# Patient Record
Sex: MALE | Race: WHITE | NOT HISPANIC OR LATINO | Employment: FULL TIME | URBAN - METROPOLITAN AREA
[De-identification: names, ages, dates, MRNs, and addresses within clinical notes are randomized per-mention and may not be internally consistent; named-entity substitution may affect disease eponyms.]

---

## 2021-01-29 DIAGNOSIS — Z23 ENCOUNTER FOR IMMUNIZATION: ICD-10-CM

## 2021-02-04 ENCOUNTER — IMMUNIZATIONS (OUTPATIENT)
Dept: FAMILY MEDICINE CLINIC | Facility: HOSPITAL | Age: 41
End: 2021-02-04

## 2021-02-04 DIAGNOSIS — Z23 ENCOUNTER FOR IMMUNIZATION: Primary | ICD-10-CM

## 2021-02-04 PROCEDURE — 0001A SARS-COV-2 / COVID-19 MRNA VACCINE (PFIZER-BIONTECH) 30 MCG: CPT

## 2021-02-04 PROCEDURE — 91300 SARS-COV-2 / COVID-19 MRNA VACCINE (PFIZER-BIONTECH) 30 MCG: CPT

## 2021-02-24 ENCOUNTER — IMMUNIZATIONS (OUTPATIENT)
Dept: FAMILY MEDICINE CLINIC | Facility: HOSPITAL | Age: 41
End: 2021-02-24

## 2021-02-24 DIAGNOSIS — Z23 ENCOUNTER FOR IMMUNIZATION: Primary | ICD-10-CM

## 2021-02-24 PROCEDURE — 0002A SARS-COV-2 / COVID-19 MRNA VACCINE (PFIZER-BIONTECH) 30 MCG: CPT

## 2021-02-24 PROCEDURE — 91300 SARS-COV-2 / COVID-19 MRNA VACCINE (PFIZER-BIONTECH) 30 MCG: CPT

## 2024-11-13 NOTE — PROGRESS NOTES
Colon and Rectal Surgery   Carina Damian 43 y.o. male MRN: [id number]   Encounter: [id number]  11/15/24   2:15 PM      Name: Carina Damian      : 1980      MRN: [id number]  Encounter Provider: Adelso Leahy MD  Encounter Date: 11/15/2024   Encounter department: [facility] COLON AND RECTAL SURGERY [place]  :  Assessment & Plan  Family history of polyps in the colon  Carina is a 43-year-old male, he has been having some troubles with anal itching, rectal bleeding on the paper chronically at this point.  Family history significant, grandmother passed from colon cancer,All three siblings/first-degree relatives in their 40s with colon polyps.    We did discuss potential for genetic testing/polyposis syndrome such as Griggs to be ruled out.  We also discussed colonoscopy to rule out proximal lesion as well as dietary/lifestyle changes.      -Genetic counseling discussed/consult ordered  -Screening colonoscopy,discussed in a face-to-face, personal, informed consent process, the benefits, alternatives, risks including not limited to bleeding, missed lesion, perforation requiring emergent surgery discussed/understood, scheduled today  CC:  Tito Willson, DO Yasmin Lopez, MS    Orders:    Ambulatory Referral to Oncology Genetics; Future    Rectal bleeding               Carina Damian is a 43 y.o. male referred for evaluation today by Dr. Willson for rectal bleeding.       Historical Information   No past medical history on file.  No past surgical history on file.    Meds/Allergies       Current Outpatient Medications:     simvastatin (ZOCOR) 20 mg tablet, Take 20 mg by mouth, Disp: , Rfl:       No Known Allergies      Social History   Social History     Substance and Sexual Activity   Alcohol Use Not on file     Social History     Substance and Sexual Activity   Drug Use Not on file     Social History     Tobacco Use   Smoking Status Not on file   Smokeless Tobacco Not on file         Family  History:   Grandmother passed from colon cancer  3 siblings, with colon polyps in their 40s    Objective   Current Vitals:   Vitals:    11/15/24 1353   BP: 124/68   Weight: 95.7 kg (211 lb)   Height: 6' (1.829 m)     Physical Exam:  General:no distress  Neck:supple  Pulm:no increased work of breathing  CV:sinus  Abdomen:soft,nontender  Extremities:no edema

## 2024-11-13 NOTE — H&P (VIEW-ONLY)
Colon and Rectal Surgery   Carina Damian 43 y.o. male MRN: [id number]   Encounter: [id number]  11/15/24   2:15 PM      Name: Carina Damian      : 1980      MRN: [id number]  Encounter Provider: Adelso Leahy MD  Encounter Date: 11/15/2024   Encounter department: [facility] COLON AND RECTAL SURGERY [place]  :  Assessment & Plan  Family history of polyps in the colon  Carina is a 43-year-old male, he has been having some troubles with anal itching, rectal bleeding on the paper chronically at this point.  Family history significant, grandmother passed from colon cancer,All three siblings/first-degree relatives in their 40s with colon polyps.    We did discuss potential for genetic testing/polyposis syndrome such as Griggs to be ruled out.  We also discussed colonoscopy to rule out proximal lesion as well as dietary/lifestyle changes.      -Genetic counseling discussed/consult ordered  -Screening colonoscopy,discussed in a face-to-face, personal, informed consent process, the benefits, alternatives, risks including not limited to bleeding, missed lesion, perforation requiring emergent surgery discussed/understood, scheduled today  CC:  Tito Willson, DO Yasmin Lopez, MS    Orders:    Ambulatory Referral to Oncology Genetics; Future    Rectal bleeding               Carina Damian is a 43 y.o. male referred for evaluation today by Dr. Willson for rectal bleeding.       Historical Information   No past medical history on file.  No past surgical history on file.    Meds/Allergies       Current Outpatient Medications:     simvastatin (ZOCOR) 20 mg tablet, Take 20 mg by mouth, Disp: , Rfl:       No Known Allergies      Social History   Social History     Substance and Sexual Activity   Alcohol Use Not on file     Social History     Substance and Sexual Activity   Drug Use Not on file     Social History     Tobacco Use   Smoking Status Not on file   Smokeless Tobacco Not on file         Family  History:   Grandmother passed from colon cancer  3 siblings, with colon polyps in their 40s    Objective   Current Vitals:   Vitals:    11/15/24 1353   BP: 124/68   Weight: 95.7 kg (211 lb)   Height: 6' (1.829 m)     Physical Exam:  General:no distress  Neck:supple  Pulm:no increased work of breathing  CV:sinus  Abdomen:soft,nontender  Extremities:no edema

## 2024-11-15 ENCOUNTER — PREP FOR PROCEDURE (OUTPATIENT)
Age: 44
End: 2024-11-15

## 2024-11-15 ENCOUNTER — OFFICE VISIT (OUTPATIENT)
Age: 44
End: 2024-11-15
Payer: COMMERCIAL

## 2024-11-15 ENCOUNTER — TELEPHONE (OUTPATIENT)
Age: 44
End: 2024-11-15

## 2024-11-15 VITALS
BODY MASS INDEX: 28.58 KG/M2 | WEIGHT: 211 LBS | SYSTOLIC BLOOD PRESSURE: 124 MMHG | HEIGHT: 72 IN | DIASTOLIC BLOOD PRESSURE: 68 MMHG

## 2024-11-15 DIAGNOSIS — K62.5 RECTAL BLEEDING: Primary | ICD-10-CM

## 2024-11-15 DIAGNOSIS — K62.5 RECTAL BLEEDING: ICD-10-CM

## 2024-11-15 DIAGNOSIS — Z80.0 FAMILY HISTORY OF COLON CANCER: ICD-10-CM

## 2024-11-15 DIAGNOSIS — Z83.719 FAMILY HISTORY OF POLYPS IN THE COLON: Primary | ICD-10-CM

## 2024-11-15 DIAGNOSIS — Z83.719 FAMILY HISTORY OF COLONIC POLYPS: ICD-10-CM

## 2024-11-15 PROCEDURE — 99204 OFFICE O/P NEW MOD 45 MIN: CPT | Performed by: COLON & RECTAL SURGERY

## 2024-11-15 RX ORDER — SIMVASTATIN 20 MG
20 TABLET ORAL
COMMUNITY

## 2024-11-15 NOTE — ASSESSMENT & PLAN NOTE
Carina is a 43-year-old male, he has been having some troubles with anal itching, rectal bleeding on the paper chronically at this point.  Family history significant, grandmother passed from colon cancer,All three siblings/first-degree relatives in their 40s with colon polyps.    We did discuss potential for genetic testing/polyposis syndrome such as Griggs to be ruled out.  We also discussed colonoscopy to rule out proximal lesion as well as dietary/lifestyle changes.      -Genetic counseling discussed/consult ordered  -Screening colonoscopy,discussed in a face-to-face, personal, informed consent process, the benefits, alternatives, risks including not limited to bleeding, missed lesion, perforation requiring emergent surgery discussed/understood, scheduled today  CC:  DO Yasmin Marie, MS    Orders:    Ambulatory Referral to Oncology Genetics; Future

## 2024-11-15 NOTE — TELEPHONE ENCOUNTER
Pt seen 11/15/24 for rectal bleeding. Family history of colon cancer in his maternal grandmother. Family history of colon polyps through several members of his immediate family.     Colonoscopy scheduled for 11/18/24 with [facility]  No prior colonoscopy  Instructions handed to patient and sent through [employer]

## 2024-11-18 ENCOUNTER — ANESTHESIA EVENT (OUTPATIENT)
Dept: GASTROENTEROLOGY | Facility: HOSPITAL | Age: 44
End: 2024-11-18
Payer: COMMERCIAL

## 2024-11-18 ENCOUNTER — ANESTHESIA (OUTPATIENT)
Dept: GASTROENTEROLOGY | Facility: HOSPITAL | Age: 44
End: 2024-11-18
Payer: COMMERCIAL

## 2024-11-18 ENCOUNTER — HOSPITAL ENCOUNTER (OUTPATIENT)
Dept: GASTROENTEROLOGY | Facility: HOSPITAL | Age: 44
Setting detail: OUTPATIENT SURGERY
Discharge: HOME | End: 2024-11-18
Attending: COLON & RECTAL SURGERY
Payer: COMMERCIAL

## 2024-11-18 VITALS
DIASTOLIC BLOOD PRESSURE: 69 MMHG | HEART RATE: 67 BPM | WEIGHT: 199.8 LBS | HEIGHT: 72 IN | RESPIRATION RATE: 16 BRPM | BODY MASS INDEX: 27.06 KG/M2 | SYSTOLIC BLOOD PRESSURE: 108 MMHG | OXYGEN SATURATION: 97 % | TEMPERATURE: 99 F

## 2024-11-18 DIAGNOSIS — K62.5 RECTAL BLEEDING: ICD-10-CM

## 2024-11-18 DIAGNOSIS — Z80.0 FAMILY HISTORY OF COLON CANCER: ICD-10-CM

## 2024-11-18 DIAGNOSIS — Z83.719 FAMILY HISTORY OF COLONIC POLYPS: ICD-10-CM

## 2024-11-18 PROCEDURE — 45380 COLONOSCOPY AND BIOPSY: CPT | Performed by: COLON & RECTAL SURGERY

## 2024-11-18 PROCEDURE — 88305 TISSUE EXAM BY PATHOLOGIST: CPT | Performed by: PATHOLOGY

## 2024-11-18 RX ORDER — PROPOFOL 10 MG/ML
INJECTION, EMULSION INTRAVENOUS AS NEEDED
Status: DISCONTINUED | OUTPATIENT
Start: 2024-11-18 | End: 2024-11-18

## 2024-11-18 RX ORDER — LIDOCAINE HYDROCHLORIDE 20 MG/ML
INJECTION, SOLUTION EPIDURAL; INFILTRATION; INTRACAUDAL; PERINEURAL AS NEEDED
Status: DISCONTINUED | OUTPATIENT
Start: 2024-11-18 | End: 2024-11-18

## 2024-11-18 RX ORDER — SIMETHICONE 40MG/0.6ML
SUSPENSION, DROPS(FINAL DOSAGE FORM)(ML) ORAL AS NEEDED
Status: COMPLETED | OUTPATIENT
Start: 2024-11-18 | End: 2024-11-18

## 2024-11-18 RX ORDER — SODIUM CHLORIDE, SODIUM LACTATE, POTASSIUM CHLORIDE, CALCIUM CHLORIDE 600; 310; 30; 20 MG/100ML; MG/100ML; MG/100ML; MG/100ML
INJECTION, SOLUTION INTRAVENOUS CONTINUOUS PRN
Status: DISCONTINUED | OUTPATIENT
Start: 2024-11-18 | End: 2024-11-18

## 2024-11-18 RX ADMIN — PROPOFOL 50 MG: 10 INJECTION, EMULSION INTRAVENOUS at 12:13

## 2024-11-18 RX ADMIN — PROPOFOL 50 MG: 10 INJECTION, EMULSION INTRAVENOUS at 12:11

## 2024-11-18 RX ADMIN — PROPOFOL 150 MG: 10 INJECTION, EMULSION INTRAVENOUS at 12:10

## 2024-11-18 RX ADMIN — Medication 40 MG: at 12:21

## 2024-11-18 RX ADMIN — PROPOFOL 50 MG: 10 INJECTION, EMULSION INTRAVENOUS at 12:17

## 2024-11-18 RX ADMIN — PROPOFOL 50 MG: 10 INJECTION, EMULSION INTRAVENOUS at 12:29

## 2024-11-18 RX ADMIN — PROPOFOL 50 MG: 10 INJECTION, EMULSION INTRAVENOUS at 12:36

## 2024-11-18 RX ADMIN — PROPOFOL 50 MG: 10 INJECTION, EMULSION INTRAVENOUS at 12:25

## 2024-11-18 RX ADMIN — PROPOFOL 50 MG: 10 INJECTION, EMULSION INTRAVENOUS at 12:32

## 2024-11-18 RX ADMIN — LIDOCAINE HYDROCHLORIDE 100 MG: 20 INJECTION, SOLUTION EPIDURAL; INFILTRATION; INTRACAUDAL; PERINEURAL at 12:10

## 2024-11-18 RX ADMIN — SODIUM CHLORIDE, SODIUM LACTATE, POTASSIUM CHLORIDE, AND CALCIUM CHLORIDE: .6; .31; .03; .02 INJECTION, SOLUTION INTRAVENOUS at 11:58

## 2024-11-18 RX ADMIN — PROPOFOL 50 MG: 10 INJECTION, EMULSION INTRAVENOUS at 12:21

## 2024-11-18 RX ADMIN — PROPOFOL 50 MG: 10 INJECTION, EMULSION INTRAVENOUS at 12:38

## 2024-11-18 NOTE — ANESTHESIA PREPROCEDURE EVALUATION
Procedure:  COLONOSCOPY    Relevant Problems   ANESTHESIA (within normal limits)      CARDIO   (-) Angina at rest (HCC)   (-) Angina of effort (HCC)   (-) Chest pain   (-) RAMSAY (dyspnea on exertion)      ENDO   (-) Diabetes mellitus type 1 (HCC)   (-) Diabetes mellitus, type 2 (HCC)      GI/HEPATIC   (+) Rectal bleeding   (-) Chronic liver disease      /RENAL   (-) Chronic kidney disease      NEURO/PSYCH   (-) CVA (cerebral vascular accident) (HCC)   (-) Seizures (HCC)      PULMONARY  Pt has a sleep study scheduled for March for suspected JANE   (-) Asthma   (-) Chronic obstructive pulmonary disease (HCC)        Physical Exam    Airway    Mallampati score: III  TM Distance: >3 FB  Neck ROM: full     Dental   No notable dental hx     Cardiovascular      Pulmonary      Other Findings        Anesthesia Plan  ASA Score- 1     Anesthesia Type- IV sedation with anesthesia with ASA Monitors.         Additional Monitors:     Airway Plan:            Plan Factors-Exercise tolerance (METS): >4 METS.    Chart reviewed.   Existing labs reviewed. Patient summary reviewed.                  Induction- intravenous.    Postoperative Plan-         Informed Consent- Anesthetic plan and risks discussed with patient.  I personally reviewed this patient with the CRNA. Discussed and agreed on the Anesthesia Plan with the CRNA..

## 2024-11-18 NOTE — ANESTHESIA POSTPROCEDURE EVALUATION
Post-Op Assessment Note    CV Status:  Stable  Pain Score: 0    Pain management: adequate       Mental Status:  Alert and awake   Hydration Status:  Euvolemic   PONV Controlled:  Controlled   Airway Patency:  Patent     Post Op Vitals Reviewed: Yes    No anethesia notable event occurred.    Staff: Anesthesiologist, CRNA           Last Filed PACU Vitals:  Vitals Value Taken Time   Temp     Pulse     BP     Resp     SpO2         Modified Pierre:  Activity: 2 (11/18/2024 11:15 AM)  Respiration: 2 (11/18/2024 11:15 AM)  Circulation: 2 (11/18/2024 11:15 AM)  Consciousness: 2 (11/18/2024 11:15 AM)  Oxygen Saturation: 2 (11/18/2024 11:15 AM)  Modified Pierre Score: 10 (11/18/2024 11:15 AM)

## 2024-11-18 NOTE — INTERVAL H&P NOTE
H&P reviewed. After examining the patient I find no changes in the patients condition since the H&P had been written.    Vitals:    11/18/24 1133   BP: 159/84   Pulse: 91   Resp: 16   Temp: 99 °F (37.2 °C)   SpO2: 99%

## 2024-11-18 NOTE — TELEPHONE ENCOUNTER
Patient called to know the time of scheduled colonoscopy since he did not receive a call. Patient was informed that time of procedure is at 11 am. Patient was also recommended to go 45 mins prior to procedure

## 2024-11-24 PROCEDURE — 88305 TISSUE EXAM BY PATHOLOGIST: CPT | Performed by: PATHOLOGY

## 2024-11-25 ENCOUNTER — RESULTS FOLLOW-UP (OUTPATIENT)
Dept: OTHER | Facility: HOSPITAL | Age: 44
End: 2024-11-25

## 2024-12-06 ENCOUNTER — HOSPITAL ENCOUNTER (EMERGENCY)
Facility: HOSPITAL | Age: 44
Discharge: HOME | End: 2024-12-06
Attending: EMERGENCY MEDICINE
Payer: COMMERCIAL

## 2024-12-06 VITALS
SYSTOLIC BLOOD PRESSURE: 167 MMHG | HEART RATE: 86 BPM | OXYGEN SATURATION: 100 % | TEMPERATURE: 98.5 F | RESPIRATION RATE: 20 BRPM | DIASTOLIC BLOOD PRESSURE: 89 MMHG

## 2024-12-06 DIAGNOSIS — J02.9 VIRAL PHARYNGITIS: Primary | ICD-10-CM

## 2024-12-06 LAB
FLUAV AG UPPER RESP QL IA.RAPID: NEGATIVE
FLUBV AG UPPER RESP QL IA.RAPID: NEGATIVE
SARS-COV+SARS-COV-2 AG RESP QL IA.RAPID: NEGATIVE

## 2024-12-06 PROCEDURE — 99284 EMERGENCY DEPT VISIT MOD MDM: CPT | Performed by: EMERGENCY MEDICINE

## 2024-12-06 PROCEDURE — 87811 SARS-COV-2 COVID19 W/OPTIC: CPT

## 2024-12-06 PROCEDURE — 96372 THER/PROPH/DIAG INJ SC/IM: CPT

## 2024-12-06 PROCEDURE — 99283 EMERGENCY DEPT VISIT LOW MDM: CPT

## 2024-12-06 PROCEDURE — 87804 INFLUENZA ASSAY W/OPTIC: CPT

## 2024-12-06 RX ORDER — DIPHENHYDRAMINE HYDROCHLORIDE AND LIDOCAINE HYDROCHLORIDE AND ALUMINUM HYDROXIDE AND MAGNESIUM HYDRO
10 KIT ONCE
Status: COMPLETED | OUTPATIENT
Start: 2024-12-06 | End: 2024-12-06

## 2024-12-06 RX ORDER — DIPHENHYDRAMINE HYDROCHLORIDE AND LIDOCAINE HYDROCHLORIDE AND ALUMINUM HYDROXIDE AND MAGNESIUM HYDRO
10 KIT EVERY 4 HOURS PRN
Qty: 119 ML | Refills: 0 | Status: SHIPPED | OUTPATIENT
Start: 2024-12-06

## 2024-12-06 RX ORDER — ACETAMINOPHEN 325 MG/1
650 TABLET ORAL ONCE
Status: COMPLETED | OUTPATIENT
Start: 2024-12-06 | End: 2024-12-06

## 2024-12-06 RX ORDER — KETOROLAC TROMETHAMINE 30 MG/ML
15 INJECTION, SOLUTION INTRAMUSCULAR; INTRAVENOUS ONCE
Status: COMPLETED | OUTPATIENT
Start: 2024-12-06 | End: 2024-12-06

## 2024-12-06 RX ORDER — ALBUTEROL SULFATE 90 UG/1
2 INHALANT RESPIRATORY (INHALATION) ONCE
Status: COMPLETED | OUTPATIENT
Start: 2024-12-06 | End: 2024-12-06

## 2024-12-06 RX ADMIN — DEXAMETHASONE 10 MG: 2 TABLET ORAL at 20:52

## 2024-12-06 RX ADMIN — DIPHENHYDRAMINE HYDROCHLORIDE AND LIDOCAINE HYDROCHLORIDE AND ALUMINUM HYDROXIDE AND MAGNESIUM HYDRO 10 ML: KIT at 20:52

## 2024-12-06 RX ADMIN — ALBUTEROL SULFATE 2 PUFF: 90 AEROSOL, METERED RESPIRATORY (INHALATION) at 21:14

## 2024-12-06 RX ADMIN — ACETAMINOPHEN 650 MG: 325 TABLET, FILM COATED ORAL at 20:52

## 2024-12-06 RX ADMIN — KETOROLAC TROMETHAMINE 15 MG: 30 INJECTION, SOLUTION INTRAMUSCULAR; INTRAVENOUS at 21:12

## 2024-12-06 NOTE — Clinical Note
Carina Damian was seen and treated in our emergency department on 12/6/2024.                Diagnosis:     Carina  .    He may return on this date: 12/08/2024         If you have any questions or concerns, please don't hesitate to call.      Pino Zepeda MD    ______________________________           _______________          _______________  Hospital Representative                              Date                                Time

## 2024-12-07 NOTE — ED ATTENDING ATTESTATION
12/6/2024  I, Joanna Cunningham MD, saw and evaluated the patient. I have discussed the patient with the resident/non-physician practitioner and agree with the resident's/non-physician practitioner's findings, Plan of Care, and MDM as documented in the resident's/non-physician practitioner's note, except where noted. All available labs and Radiology studies were reviewed.  I was present for key portions of any procedure(s) performed by the resident/non-physician practitioner and I was immediately available to provide assistance.       At this point I agree with the current assessment done in the Emergency Department.  I have conducted an independent evaluation of this patient a history and physical is as follows:    ED Course         Critical Care Time  Procedures    42 yo male with five days of uri symptoms, fever, sore throat, cough. Pt had negative strep and mono tests. Pt with some relief with tylenol, motrin. Pt also taking abx currently.  No cp, no sob. No abdominal pain, no n/v/d.  Vss, afebrile, lungs cta, rrr, abdomen soft nontender, phayrngeal erythema.  Viral swab, decadron, pain meds. Viral illness.

## 2024-12-07 NOTE — ED PROVIDER NOTES
Time reflects when diagnosis was documented in both MDM as applicable and the Disposition within this note       Time User Action Codes Description Comment    12/6/2024  8:04 PM Pino Zepeda [J02.9] Viral pharyngitis           ED Disposition       ED Disposition   Discharge    Condition   Stable    Date/Time   Fri Dec 6, 2024  8:04 PM    Comment   Carina Damian discharge to home/self care.                   Assessment & Plan       Medical Decision Making  Patient is 43-year-old male presenting for concerns of viral URI symptoms, sore throat.  DDx: Viral pharyngitis  Based on patient presentation and physical exam findings confirm concerns for viral pharyngitis.  Plan for symptomatic treatment with Tylenol Toradol Magic mouthwash, Decadron, viral swab.  Instructed to continue conservative measures at home.  Will provide patient with inhaler stating that he ran out of inhalers at home.  Return precautions given.  Patient understands agrees.  Ready for discharge.    Problems Addressed:  Viral pharyngitis: acute illness or injury    Amount and/or Complexity of Data Reviewed  Labs: ordered.    Risk  OTC drugs.  Prescription drug management.             Medications   acetaminophen (TYLENOL) tablet 650 mg (650 mg Oral Given 12/6/24 2052)   ketorolac (TORADOL) injection 15 mg (15 mg Intramuscular Given 12/6/24 2112)   dexamethasone (DECADRON) tablet 10 mg (10 mg Oral Given 12/6/24 2052)   albuterol (PROVENTIL HFA,VENTOLIN HFA) inhaler 2 puff (2 puffs Inhalation Given 12/6/24 2114)   diphenhydramine, lidocaine, Al/Mg hydroxide, simethicone (Magic Mouthwash) oral solution 10 mL (10 mL Swish & Spit Given 12/6/24 2052)       ED Risk Strat Scores                                               History of Present Illness       Chief Complaint   Patient presents with    Fever     Pt states he has been having ongoing flu symptoms for last few weeks with fevers as high as 103. Pt also reports white spots on back of throat  since Wednesday. Currently on amoxicillin. Last advil around an hour ago       Past Medical History:   Diagnosis Date    Hyperlipidemia       History reviewed. No pertinent surgical history.   History reviewed. No pertinent family history.   Social History     Tobacco Use    Smoking status: Never    Smokeless tobacco: Never   Substance Use Topics    Alcohol use: Yes     Comment: social    Drug use: Never      E-Cigarette/Vaping      E-Cigarette/Vaping Substances      I have reviewed and agree with the history as documented.     HPI  Patient is 43-year-old male presenting for concerns of viral URI symptoms, sore throat that has been ongoing since Saturday/Sunday.  Past medical history significant for his entire family has been sick with similar symptoms.  Had negative mono and strep swab at urgent care however was prescribed amoxicillin for coverage of antibiotic infection she has been taking since Wednesday.  Patient states has intermittent relief with ibuprofen.  No shortness of breath no lightheadedness no dizziness no nausea vomiting diarrhea no chest pain.  Review of Systems   Constitutional:  Positive for chills and fever.   HENT:  Positive for congestion, rhinorrhea and sore throat.    Eyes: Negative.    Respiratory:  Positive for cough.    Cardiovascular: Negative.    Gastrointestinal: Negative.    Endocrine: Negative.    Genitourinary: Negative.    Musculoskeletal: Negative.    Allergic/Immunologic: Negative.    Neurological: Negative.    Hematological: Negative.    Psychiatric/Behavioral: Negative.             Objective       ED Triage Vitals [12/06/24 1910]   Temperature Pulse Blood Pressure Respirations SpO2 Patient Position - Orthostatic VS   98.5 °F (36.9 °C) 86 167/89 20 100 % Sitting      Temp Source Heart Rate Source BP Location FiO2 (%) Pain Score    Oral Monitor Left arm -- 2      Vitals      Date and Time Temp Pulse SpO2 Resp BP Pain Score FACES Pain Rating User   12/06/24 2120 -- -- -- -- -- 3 --     12/06/24 2112 -- -- -- -- -- 3 --    12/06/24 2052 -- -- -- -- -- 2 --    12/06/24 1910 98.5 °F (36.9 °C) 86 100 % 20 167/89 2 -- OB            Physical Exam  Vitals and nursing note reviewed.   Constitutional:       Appearance: Normal appearance. He is normal weight.   HENT:      Head: Normocephalic and atraumatic.      Right Ear: Tympanic membrane, ear canal and external ear normal.      Left Ear: Tympanic membrane, ear canal and external ear normal.      Nose: Nose normal.      Mouth/Throat:      Mouth: Mucous membranes are moist.      Pharynx: Oropharynx is clear. Posterior oropharyngeal erythema present. No oropharyngeal exudate.      Comments: Posterior oropharynx has erythema but no exudate, no peritonsillar deviation/swelling, uvula is midline, no uvular swelling.  Eyes:      Extraocular Movements: Extraocular movements intact.      Conjunctiva/sclera: Conjunctivae normal.      Pupils: Pupils are equal, round, and reactive to light.   Cardiovascular:      Rate and Rhythm: Normal rate and regular rhythm.      Pulses: Normal pulses.      Heart sounds: Normal heart sounds.   Pulmonary:      Effort: Pulmonary effort is normal.      Breath sounds: Normal breath sounds.   Abdominal:      General: Abdomen is flat. Bowel sounds are normal.      Palpations: Abdomen is soft.   Musculoskeletal:         General: Normal range of motion.      Cervical back: Normal range of motion and neck supple.   Skin:     General: Skin is warm and dry.      Capillary Refill: Capillary refill takes less than 2 seconds.   Neurological:      General: No focal deficit present.      Mental Status: He is alert and oriented to person, place, and time.   Psychiatric:         Mood and Affect: Mood normal.         Behavior: Behavior normal.         Thought Content: Thought content normal.         Judgment: Judgment normal.         Results Reviewed       Procedure Component Value Units Date/Time    FLU/COVID Rapid Antigen (30 min. TAT) -  Preferred screening test in ED [[id number]]  (Normal) Collected: 12/06/24 2055    Lab Status: Final result Specimen: Nares from Nose Updated: 12/06/24 2120     SARS COV Rapid Antigen Negative     Influenza A Rapid Antigen Negative     Influenza B Rapid Antigen Negative    Narrative:      This test has been performed using the [employer] Chantale 2 FLU+SARS Antigen test under the Emergency Use Authorization (EUA). This test has been validated by the  and verified by the performing laboratory. The Chantale uses lateral flow immunofluorescent sandwich assay to detect SARS-COV, Influenza A and Influenza B Antigen.     The [employer] Chantale 2 SARS Antigen test does not differentiate between SARS-CoV and SARS-CoV-2.     Negative results are presumptive and may be confirmed with a molecular assay, if necessary, for patient management. Negative results do not rule out SARS-CoV-2 or influenza infection and should not be used as the sole basis for treatment or patient management decisions. A negative test result may occur if the level of antigen in a sample is below the limit of detection of this test.     Positive results are indicative of the presence of viral antigens, but do not rule out bacterial infection or co-infection with other viruses.     All test results should be used as an adjunct to clinical observations and other information available to the provider.    FOR PEDIATRIC PATIENTS - copy/paste COVID Guidelines URL to browser: [email or web address]            No orders to display       Procedures    ED Medication and Procedure Management   Prior to Admission Medications   Prescriptions Last Dose Informant Patient Reported? Taking?   simvastatin (ZOCOR) 20 mg tablet   Yes No   Sig: Take 20 mg by mouth      Facility-Administered Medications: None     Discharge Medication List as of 12/6/2024  8:05 PM        START taking these medications    Details   diphenhydramine,  lidocaine, Al/Mg hydroxide, simethicone (Magic Mouthwash) SUSP Swish and spit 10 mL every 4 (four) hours as needed for mouth pain or discomfort, Starting Fri 12/6/2024, Normal           CONTINUE these medications which have NOT CHANGED    Details   simvastatin (ZOCOR) 20 mg tablet Take 20 mg by mouth, Historical Med           No discharge procedures on file.  ED SEPSIS DOCUMENTATION   Time reflects when diagnosis was documented in both MDM as applicable and the Disposition within this note       Time User Action Codes Description Comment    12/6/2024  8:04 PM Pino Zepeda Add [J02.9] Viral pharyngitis                  Pino Zepeda MD  12/06/24 8961